# Patient Record
(demographics unavailable — no encounter records)

---

## 2025-05-08 NOTE — PLAN
[FreeTextEntry1] : #HCM -Breast self exam reviewed and taught -STI Screening neg with CORNEL -Pap/HPV today and GC/CT -Immunizations: UTD  #preconception -pt started PNV -plan for FET in June 2025 -c/w levothyroxine  per CORNEL  RTO in 1 year for annual GYN exam or PRN for any GYN complaints MANJIT Coughlin MD

## 2025-05-08 NOTE — HISTORY OF PRESENT ILLNESS
[FreeTextEntry1] : RENETTA PATHAK 34 year, , LMP: 25, no PMH, presents for annual GYN exam.  She denies abdominal and pelvic pain. No vaginal discharge or vaginitis symptoms. No urinary complaints. BM is normal per patient.  Obhx: G1- : MAB s/p meds G2-  VAVD 39w5d Petra (girl) IVF GYNhx: denies fibroids, ovarian cysts, abl paps, STIs PMH denies PSH: denies Med: levothyroxine (prior to frozen embryo transfer) All: NKDA Soc: soc etoh, denies T/D Psych: PHQ9=0 Famhx: MGM-breast ca @ 86; denies colon ca, ovarian ca [PapSmeardate] : 4/2025 [TextBox_31] : NILM, HRHPV neg